# Patient Record
Sex: FEMALE | Race: WHITE
[De-identification: names, ages, dates, MRNs, and addresses within clinical notes are randomized per-mention and may not be internally consistent; named-entity substitution may affect disease eponyms.]

---

## 2017-09-14 ENCOUNTER — HOSPITAL ENCOUNTER (EMERGENCY)
Dept: HOSPITAL 12 - ER | Age: 67
Discharge: HOME | End: 2017-09-14
Payer: MEDICARE

## 2017-09-14 VITALS — BODY MASS INDEX: 25.33 KG/M2 | WEIGHT: 152 LBS | HEIGHT: 65 IN
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
## 2017-09-14 DIAGNOSIS — S13.4XXA: ICD-10-CM

## 2017-09-14 DIAGNOSIS — S39.012A: Primary | ICD-10-CM

## 2017-09-14 DIAGNOSIS — I10: ICD-10-CM
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
## 2017-09-14 DIAGNOSIS — Z79.82: ICD-10-CM

## 2017-09-14 PROCEDURE — 99284 EMERGENCY DEPT VISIT MOD MDM: CPT

## 2017-09-14 PROCEDURE — 72125 CT NECK SPINE W/O DYE: CPT

## 2017-09-14 PROCEDURE — 72131 CT LUMBAR SPINE W/O DYE: CPT

## 2017-09-14 PROCEDURE — A4663 DIALYSIS BLOOD PRESSURE CUFF: HCPCS

## 2017-09-14 PROCEDURE — 70450 CT HEAD/BRAIN W/O DYE: CPT

## 2019-03-16 ENCOUNTER — HOSPITAL ENCOUNTER (EMERGENCY)
Dept: HOSPITAL 12 - ER | Age: 69
Discharge: HOME | End: 2019-03-16
Payer: MEDICARE

## 2019-03-16 VITALS — SYSTOLIC BLOOD PRESSURE: 118 MMHG | DIASTOLIC BLOOD PRESSURE: 90 MMHG

## 2019-03-16 VITALS — HEIGHT: 65 IN | WEIGHT: 160 LBS | BODY MASS INDEX: 26.66 KG/M2

## 2019-03-16 DIAGNOSIS — I10: ICD-10-CM

## 2019-03-16 DIAGNOSIS — Z79.899: ICD-10-CM

## 2019-03-16 DIAGNOSIS — Z79.1: ICD-10-CM

## 2019-03-16 DIAGNOSIS — J40: Primary | ICD-10-CM

## 2019-03-16 PROCEDURE — A4663 DIALYSIS BLOOD PRESSURE CUFF: HCPCS

## 2019-03-16 NOTE — NUR
Patient discharged to home in stable conditon with daughter taking patient 
home.  Written and verbal after care instructions given. 

Patient verbalizes understanding of instructions. Walked out of ER with no 
distress noted

## 2019-07-24 ENCOUNTER — HOSPITAL ENCOUNTER (EMERGENCY)
Dept: HOSPITAL 12 - ER | Age: 69
Discharge: HOME | End: 2019-07-24
Payer: MEDICARE

## 2019-07-24 VITALS — HEIGHT: 65 IN | WEIGHT: 156 LBS | BODY MASS INDEX: 25.99 KG/M2

## 2019-07-24 DIAGNOSIS — Z79.899: ICD-10-CM

## 2019-07-24 DIAGNOSIS — R07.2: Primary | ICD-10-CM

## 2019-07-24 DIAGNOSIS — I10: ICD-10-CM

## 2019-07-24 DIAGNOSIS — Z79.82: ICD-10-CM

## 2019-07-24 DIAGNOSIS — F41.9: ICD-10-CM

## 2019-07-24 DIAGNOSIS — Z79.1: ICD-10-CM

## 2019-07-24 DIAGNOSIS — K21.9: ICD-10-CM

## 2019-07-24 DIAGNOSIS — E78.5: ICD-10-CM

## 2019-07-24 LAB
ALP SERPL-CCNC: 51 U/L (ref 50–136)
ALT SERPL W/O P-5'-P-CCNC: 27 U/L (ref 14–59)
AST SERPL-CCNC: 16 U/L (ref 15–37)
BASOPHILS # BLD AUTO: 0 K/UL (ref 0–8)
BASOPHILS NFR BLD AUTO: 0.4 % (ref 0–2)
BILIRUB DIRECT SERPL-MCNC: 0.1 MG/DL (ref 0–0.2)
BILIRUB SERPL-MCNC: 0.4 MG/DL (ref 0.2–1)
BUN SERPL-MCNC: 17 MG/DL (ref 7–18)
CHLORIDE SERPL-SCNC: 107 MMOL/L (ref 98–107)
CO2 SERPL-SCNC: 24 MMOL/L (ref 21–32)
CREAT SERPL-MCNC: 0.7 MG/DL (ref 0.6–1.3)
EOSINOPHIL # BLD AUTO: 0.1 K/UL (ref 0–0.7)
EOSINOPHIL NFR BLD AUTO: 1.7 % (ref 0–7)
GLUCOSE SERPL-MCNC: 142 MG/DL (ref 74–106)
HCT VFR BLD AUTO: 35.6 % (ref 31.2–41.9)
HGB BLD-MCNC: 12.1 G/DL (ref 10.9–14.3)
LYMPHOCYTES # BLD AUTO: 1.3 K/UL (ref 20–40)
LYMPHOCYTES NFR BLD AUTO: 28.6 % (ref 20.5–51.5)
MCH RBC QN AUTO: 29.4 UUG (ref 24.7–32.8)
MCHC RBC AUTO-ENTMCNC: 34 G/DL (ref 32.3–35.6)
MCV RBC AUTO: 86.9 FL (ref 75.5–95.3)
MONOCYTES # BLD AUTO: 0.2 K/UL (ref 2–10)
MONOCYTES NFR BLD AUTO: 5.2 % (ref 0–11)
NEUTROPHILS # BLD AUTO: 2.9 K/UL (ref 1.8–8.9)
NEUTROPHILS NFR BLD AUTO: 64.1 % (ref 38.5–71.5)
PLATELET # BLD AUTO: 167 K/UL (ref 179–408)
POTASSIUM SERPL-SCNC: 3.7 MMOL/L (ref 3.5–5.1)
RBC # BLD AUTO: 4.1 MIL/UL (ref 3.63–4.92)
WBC # BLD AUTO: 4.5 K/UL (ref 3.8–11.8)
WS STN SPEC: 6.6 G/DL (ref 6.4–8.2)

## 2019-07-24 PROCEDURE — 85025 COMPLETE CBC W/AUTO DIFF WBC: CPT

## 2019-07-24 PROCEDURE — 96374 THER/PROPH/DIAG INJ IV PUSH: CPT

## 2019-07-24 PROCEDURE — 71045 X-RAY EXAM CHEST 1 VIEW: CPT

## 2019-07-24 PROCEDURE — C9113 INJ PANTOPRAZOLE SODIUM, VIA: HCPCS

## 2019-07-24 PROCEDURE — 99284 EMERGENCY DEPT VISIT MOD MDM: CPT

## 2019-07-24 PROCEDURE — A4663 DIALYSIS BLOOD PRESSURE CUFF: HCPCS

## 2019-07-24 PROCEDURE — 36415 COLL VENOUS BLD VENIPUNCTURE: CPT

## 2019-07-24 PROCEDURE — 80076 HEPATIC FUNCTION PANEL: CPT

## 2019-07-24 PROCEDURE — 84484 ASSAY OF TROPONIN QUANT: CPT

## 2019-07-24 PROCEDURE — 80048 BASIC METABOLIC PNL TOTAL CA: CPT

## 2019-07-24 PROCEDURE — 85730 THROMBOPLASTIN TIME PARTIAL: CPT

## 2019-07-24 PROCEDURE — 93005 ELECTROCARDIOGRAM TRACING: CPT

## 2019-07-24 PROCEDURE — 83880 ASSAY OF NATRIURETIC PEPTIDE: CPT

## 2019-07-24 RX ADMIN — DEXTROSE ONE MG: 50 INJECTION, SOLUTION INTRAVENOUS at 13:17

## 2019-07-24 RX ADMIN — SODIUM CHLORIDE ONE ML: 0.9 INJECTION, SOLUTION INTRAVENOUS at 12:30

## 2019-07-24 RX ADMIN — DIAZEPAM ONE MG: 2 TABLET ORAL at 13:36

## 2019-07-24 NOTE — NUR
IV removed.  Catheter intact and site benign. Pressure and 4x4 gauze applied to 
site. No bleeding noted.  Patient discharged to home in stable conditon with 
steady gait.  Written and verbal after care instructions given to patient and 
mother. Patient & family verbalized understanding & compliance of instructions.

## 2019-11-03 ENCOUNTER — HOSPITAL ENCOUNTER (EMERGENCY)
Dept: HOSPITAL 12 - ER | Age: 69
Discharge: HOME | End: 2019-11-03
Payer: MEDICARE

## 2019-11-03 VITALS — BODY MASS INDEX: 25.66 KG/M2 | HEIGHT: 65 IN | WEIGHT: 154 LBS

## 2019-11-03 VITALS — DIASTOLIC BLOOD PRESSURE: 59 MMHG | SYSTOLIC BLOOD PRESSURE: 132 MMHG

## 2019-11-03 DIAGNOSIS — K21.9: ICD-10-CM

## 2019-11-03 DIAGNOSIS — Z79.899: ICD-10-CM

## 2019-11-03 DIAGNOSIS — I10: ICD-10-CM

## 2019-11-03 DIAGNOSIS — F41.9: ICD-10-CM

## 2019-11-03 DIAGNOSIS — E78.5: ICD-10-CM

## 2019-11-03 DIAGNOSIS — Z79.82: ICD-10-CM

## 2019-11-03 DIAGNOSIS — J02.9: Primary | ICD-10-CM

## 2019-11-03 PROCEDURE — A4663 DIALYSIS BLOOD PRESSURE CUFF: HCPCS

## 2019-11-03 NOTE — NUR
Patient ambulated with stable gait. Speech clear, speaks in complete sentences. 
No neuro deficits. A/OX4. No acute respiratory distress no cough no sob.

## 2021-07-05 ENCOUNTER — HOSPITAL ENCOUNTER (EMERGENCY)
Dept: HOSPITAL 12 - ER | Age: 71
Discharge: HOME | End: 2021-07-05
Payer: MEDICARE

## 2021-07-05 VITALS — DIASTOLIC BLOOD PRESSURE: 77 MMHG | SYSTOLIC BLOOD PRESSURE: 138 MMHG

## 2021-07-05 VITALS — WEIGHT: 159 LBS | BODY MASS INDEX: 24.96 KG/M2 | HEIGHT: 67 IN

## 2021-07-05 DIAGNOSIS — I10: ICD-10-CM
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
## 2021-07-05 DIAGNOSIS — M19.011: ICD-10-CM

## 2021-07-05 DIAGNOSIS — K21.9: ICD-10-CM

## 2021-07-05 DIAGNOSIS — S73.101A: ICD-10-CM

## 2021-07-05 DIAGNOSIS — S43.401A: Primary | ICD-10-CM
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
## 2021-07-05 DIAGNOSIS — F41.9: ICD-10-CM

## 2021-07-05 DIAGNOSIS — M81.0: ICD-10-CM

## 2021-07-05 DIAGNOSIS — Z79.82: ICD-10-CM
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
## 2021-07-05 DIAGNOSIS — Z79.899: ICD-10-CM

## 2021-07-05 PROCEDURE — A4663 DIALYSIS BLOOD PRESSURE CUFF: HCPCS

## 2021-07-05 PROCEDURE — A9150 MISC/EXPER NON-PRESCRIPT DRU: HCPCS

## 2021-07-05 NOTE — NUR
Pt ambulated to ER with c/o right shoulder and hip pain s/p fall PL:7/10 1.5 
hrs PTA. A/O x4, no SOB or labored breathing. Afebrile. Denies CP/pressure. No 
GI/ distress.

## 2021-07-05 NOTE — NUR
Patient discharged to home in stable condition. A/O x4, no SOB or labored 
breathing, afebrile. Denies ay pain/discomfort at this time.  Written and 
verbal after care instructions given. Patient verbalizes understanding of 
instructions. Stressed follow up or return to ER for worsening s/s. Steady 
gait. Accompanied by grand-daughter.